# Patient Record
Sex: MALE | Race: BLACK OR AFRICAN AMERICAN | NOT HISPANIC OR LATINO | URBAN - METROPOLITAN AREA
[De-identification: names, ages, dates, MRNs, and addresses within clinical notes are randomized per-mention and may not be internally consistent; named-entity substitution may affect disease eponyms.]

---

## 2017-02-17 ENCOUNTER — OFFICE VISIT - HEALTHEAST (OUTPATIENT)
Dept: FAMILY MEDICINE | Facility: CLINIC | Age: 22
End: 2017-02-17

## 2017-02-17 DIAGNOSIS — J01.90 ACUTE SINUSITIS: ICD-10-CM

## 2021-05-30 VITALS — WEIGHT: 160.1 LBS

## 2021-06-08 NOTE — PROGRESS NOTES
Chief Complaint   Patient presents with     Cough     x one week, productive     Nasal Congestion     x one week     Headache     x one week       HPI    Patient is here for over a week of nasal discharge with congestion and facial pain, with a productive cough, and intermittent headache. No fever, chills, chest pain, shortness of breath. He had a mild sore throat and body aches at the beginning. NO hx of asthma. He took OTC cold meds without relief. Symptoms are worsening.     ROS: Pertinent ROS noted in HPI.     No Known Allergies    There is no problem list on file for this patient.      No family history on file.    Social History     Social History     Marital status: Single     Spouse name: N/A     Number of children: N/A     Years of education: N/A     Occupational History     Not on file.     Social History Main Topics     Smoking status: Never Smoker     Smokeless tobacco: Not on file     Alcohol use Not on file     Drug use: Not on file     Sexual activity: Not on file     Other Topics Concern     Not on file     Social History Narrative     No narrative on file         Objective:    Vitals:    02/17/17 1342   BP: 120/70   Pulse: 80   Resp: 16   Temp: 97.8  F (36.6  C)   SpO2: 96%       Gen:NAD  Throat: normal  Ears: normal TMs and ear canals  Nose: inflamed and congested nasal mucosa with purulent discharges   Sinus: tenderness to percussion of maxillary sinuses bilaterally  CV: RRR, no M, R, G  Pulm:C TAB, normal effort      Acute sinusitis  -     amoxicillin (AMOXIL) 875 MG tablet; Take 1 tablet (875 mg total) by mouth 2 (two) times a day for 10 days.    Supportive cares and follow up as directed.